# Patient Record
Sex: FEMALE | Race: WHITE | Employment: OTHER | URBAN - METROPOLITAN AREA
[De-identification: names, ages, dates, MRNs, and addresses within clinical notes are randomized per-mention and may not be internally consistent; named-entity substitution may affect disease eponyms.]

---

## 2020-10-12 ENCOUNTER — OFFICE VISIT (OUTPATIENT)
Dept: ORTHOPEDIC SURGERY | Age: 60
End: 2020-10-12
Payer: COMMERCIAL

## 2020-10-12 VITALS — HEIGHT: 71 IN | TEMPERATURE: 98.5 F | BODY MASS INDEX: 20.3 KG/M2 | WEIGHT: 145 LBS

## 2020-10-12 PROCEDURE — 99203 OFFICE O/P NEW LOW 30 MIN: CPT | Performed by: ORTHOPAEDIC SURGERY

## 2020-10-12 RX ORDER — METHYLPREDNISOLONE 4 MG/1
TABLET ORAL
Qty: 1 KIT | Refills: 0 | Status: SHIPPED | OUTPATIENT
Start: 2020-10-12 | End: 2020-12-04 | Stop reason: ALTCHOICE

## 2020-10-12 NOTE — PROGRESS NOTES
Chief Complaint    Hip Pain (right hip)      History of Present Illness:  Delgado Salazar is a 63 y.o. female. She is here today for evaluation of her right hip. She has had lateral sided thigh and hip pain that is been going on now intermittently over the last several years but is been progressively worse over the last 6 weeks. She states the pain is really focalized over the lateral side of her hip also during this time. She states the pain is there intermittently it is primarily with activity after she has been sitting for extended periods of time. She states it will hurt her at nighttime when she is trying to sleep. She denies radicular pain. Denies numbness or tingling. Medical History:  Patient's medications, allergies, past medical, surgical, social and family histories were reviewed and updated as appropriate. Review of Systems:  Pertinent items are noted in HPI  Review of systems reviewed from Patient History Form dated on 10/12/20 and available in the patient's chart under the Media tab. Vital Signs:  Temp 98.5 °F (36.9 °C)   Ht 5' 11\" (1.803 m)   Wt 145 lb (65.8 kg)   BMI 20.22 kg/m²     General Exam:   Constitutional: Patient is adequately groomed with no evidence of malnutrition  DTRs: Deep tendon reflexes are intact  Mental Status: The patient is oriented to time, place and person. The patient's mood and affect are appropriate. Hip Examination:    Inspection: No significant swelling erythema noted to the right hip today    Palpation: There is tenderness palpation directly over the right hip trochanteric bursa. No tenderness over the left hip.   No tenderness along the lumbar spine    Range of Motion: She does have good passive range of motion of the right hip without reproduction of her pain    Strength: She does have good strength with resisted hip flexion as well as resisted abduction adduction    Special Tests: Negative straight leg raise exam.  Negative Eleanora List exam.  Negative hip impingement testing    Skin: There are no rashes, ulcerations or lesions. Gait: Normal      Additional Comments:       Additional Examinations:         Left Upper Extremity: Examination of the left upper extremity does not show any tenderness, deformity or injury. Range of motion is unremarkable. There is no gross instability. There are no rashes, ulcerations or lesions. Strength and tone are normal.    Radiology:     X-rays obtained and reviewed in office:  Views 2 views of the right hip demonstrates no obvious fracture dislocation or other osseous abnormalities. There is well-maintained joint spaces         Assessment : Right hip trochanteric bursitis    Impression:  Encounter Diagnoses   Name Primary?  Right hip pain Yes    Trochanteric bursitis of right hip        Office Procedures:  Orders Placed This Encounter   Procedures    XR HIP RIGHT (2-3 VIEWS)     Standing Status:   Future     Number of Occurrences:   1     Standing Expiration Date:   10/12/2021       Treatment Plan: I discussed the diagnosis and treatment options with her today. Recommend at this time referral to physical therapy for trochanteric bursitis. In addition I am to place her on to a Medrol Dosepak to see if we can reduce down some of her inflammation. She is agreeable with that plan. I will see her back in clinic in 6 weeks for follow-up to make sure she is making improvement.   If continuing to have pain over the lateral side of the hip would recommend cortisone injection at that time

## 2020-10-13 ENCOUNTER — HOSPITAL ENCOUNTER (OUTPATIENT)
Dept: PHYSICAL THERAPY | Age: 60
Setting detail: THERAPIES SERIES
Discharge: HOME | End: 2020-10-13
Payer: COMMERCIAL

## 2020-10-13 PROCEDURE — 97161 PT EVAL LOW COMPLEX 20 MIN: CPT

## 2020-10-13 PROCEDURE — 97140 MANUAL THERAPY 1/> REGIONS: CPT

## 2020-10-13 PROCEDURE — 20560 NDL INSJ W/O NJX 1 OR 2 MUSC: CPT

## 2020-10-13 NOTE — PLAN OF CARE
[place] [address]  Phone [phone]   Fax [phone]                                                       Physical Therapy Certification    Dear Referring Practitioner: Shania Ugalde MD,    We had the pleasure of evaluating the following patient for physical therapy services at [address] [place] [address]. A summary of our findings can be found in the initial assessment below. This includes our plan of care. If you have any questions or concerns regarding these findings, please do not hesitate to contact me at the office phone number checked above.   Thank you for the referral.       Physician Signature:_______________________________Date:__________________  By signing above (or electronic signature), therapists plan is approved by physician      Patient: Ike Baez   : 1960   MRN: [id number]  Referring Physician: Referring Practitioner: Shania Ugalde MD      Evaluation Date: 10/13/2020      Medical Diagnosis Information:  Diagnosis: right hip trochanteric bursitis M70.61   Treatment Diagnosis: R hip pain                                         Insurance information: PT Insurance Information: [place], $5000 family deductible, no copay, 70/30 coinsurance, no visit limit as medically necessary     Precautions/ Contra-indications:     C-SSRS Triggered by Intake questionnaire (Past 2 wk assessment):   [x] No, Questionnaire did not trigger screening.   [] Yes, Patient intake triggered further evaluation      [] C-SSRS Screening completed  [] PCP notified via Plan of Care  [] Emergency services notified     Latex Allergy:  [x]NO      []YES  Preferred Language for Healthcare:   [x]English       []other:    SUBJECTIVE: Patient stated complaint: p1 = dull, achey, and tingling pain that starts over the greater trochanter and goes to lateral knee. Thigh pain has been intermittent for the last 1-2 years. Hip pain is new, started about 2 months ago. Intermittent pain, but annoying. Pain does feel better with movement, but if walking and she stops she will notice pain. Occasional low back pain and she states that if her  is massaging her back he will hit a spot that shoots pain down the lateral thigh. Relevant Medical History: melanoma  Functional Disability Index: LEFS = 11% disability    Pain Scale: 3-4/10 with prolonged standing  Easing factors: yoga, movement,   Provocative factors: achey with prolonged sitting, worse with prolonged standing 10-15 min    Type: []Constant   [x]Intermittent  []Radiating []Localized []other:     Numbness/Tingling: some tingling over lateral thigh    Occupation/School: retired teacher     Living Status/Prior Level of Function: Independent with ADLs and IADLs. Hiking, biking, yoga. OBJECTIVE:     ROM LEFT RIGHT   HIP Flex wnl wnl   HIP Abd     HIP Ext wnl wnl   HIP IR 15 20   HIP ER wnl wnl   Knee ext     Knee Flex          Lumbar flex WNL     Lumbar ext WNL    Lumbar sidebending WNL b    Quadrant WNL B    Strength  LEFT RIGHT   HIP Flexors 5 5   HIP Abductors 5 5   HIP Ext 5 5   Hip ER     Knee EXT (quad) 5 5   Knee Flex (HS) 5 5   Ankle DF 5 5   Ankle PF 5 5   Ankle Inv     Ankle EV          Circumference  Mid apex  7 cm prox               Balance: SLS 30\" bilat, no pain provocation      Reflexes/Sensation:    [x]Dermatomes/Myotomes intact    [x]Reflexes equal and normal bilaterally   []Other:    Joint mobility: Right thigh pain recreation L2-3 CLEMENTINA campbell   []Normal    []Hypo   []Hyper    Palpation: TTP greater trochanter, mid ITB.   Right lumbar paraspinals overdeveloped    Functional Mobility/Transfers: no pain with right sidelying    Posture: WFL    Bandages/Dressings/Incisions: n/a    Gait: (include devices/WB status) WNL    Orthopedic Special Tests: (-) SLR (-) MICHELLE (-) HYACINTH [x] Patient history, allergies, meds reviewed. Medical chart reviewed. See intake form. Review Of Systems (ROS):  [x]Performed Review of systems (Integumentary, CardioPulmonary, Neurological) by intake and observation. Intake form has been scanned into medical record. Patient has been instructed to contact their primary care physician regarding ROS issues if not already being addressed at this time. Co-morbidities/Complexities (which will affect course of rehabilitation):   []None           Arthritic conditions   []Rheumatoid arthritis (M05.9)  []Osteoarthritis (M19.91)   Cardiovascular conditions   []Hypertension (I10)  []Hyperlipidemia (E78.5)  []Angina pectoris (I20)  []Atherosclerosis (I70)   Musculoskeletal conditions   []Disc pathology   []Congenital spine pathologies   []Prior surgical intervention  []Osteoporosis (M81.8)  []Osteopenia (M85.8)   Endocrine conditions   []Hypothyroid (E03.9)  []Hyperthyroid Gastrointestinal conditions   []Constipation (D21.67)   Metabolic conditions   []Morbid obesity (E66.01)  []Diabetes type 1(E10.65) or 2 (E11.65)   []Neuropathy (G60.9)     Pulmonary conditions   []Asthma (J45)  []Coughing   []COPD (J44.9)   Psychological Disorders  []Anxiety (F41.9)  []Depression (F32.9)   []Other:   []Other:          Barriers to/and or personal factors that will affect rehab potential:              []Age  []Sex              []Motivation/Lack of Motivation                        []Co-Morbidities              []Cognitive Function, education/learning barriers              []Environmental, home barriers              []profession/work barriers  []past PT/medical experience  []other:  Justification: no barriers    Falls Risk Assessment (30 days):   [x] Falls Risk assessed and no intervention required.   [] Falls Risk assessed and Patient requires intervention due to being higher risk   TUG score (>12s at risk):     [] Falls education provided, including         ASSESSMENT: s/s from Dry needling      [x]other:  Lumbar facet dysfunction    Prognosis/Rehab Potential:      [x]Excellent   []Good    []Fair   []Poor    Tolerance of evaluation/treatment:    [x]Excellent   []Good    []Fair   []Poor    Physical Therapy Evaluation Complexity Justification  [x] A history of present problem with:  [x] no personal factors and/or comorbidities that impact the plan of care;  []1-2 personal factors and/or comorbidities that impact the plan of care  []3 personal factors and/or comorbidities that impact the plan of care  [x] An examination of body systems using standardized tests and measures addressing any of the following: body structures and functions (impairments), activity limitations, and/or participation restrictions;:  [x] a total of 1-2 or more elements   [] a total of 3 or more elements   [] a total of 4 or more elements   [x] A clinical presentation with:  [x] stable and/or uncomplicated characteristics   [] evolving clinical presentation with changing characteristics  [] unstable and unpredictable characteristics;   [x] Clinical decision making of [x] low, [] moderate, [] high complexity using standardized patient assessment instrument and/or measurable assessment of functional outcome. [x] EVAL (LOW) 22325 (typically 30 minutes face-to-face)  [] EVAL (MOD) 09616 (typically 30 minutes face-to-face)  [] EVAL (HIGH) 97640 (typically 45 minutes face-to-face)  [] RE-EVAL     PLAN:   Frequency/Duration:  1-2 days per week for 4 Weeks:  Interventions:  [x]  Therapeutic exercise including: strength training, ROM, for Lower extremity and core   [x]  NMR activation and proprioception for LE, Glutes and Core   [x]  Manual therapy as indicated for LE, Hip and spine to include: Dry Needling/IASTM, STM, PROM, Gr I-IV mobilizations, manipulation.    [x] Modalities as needed that may include: thermal agents, E-stim, Biofeedback, US, iontophoresis as indicated  [x] Patient education on joint protection, postural re-education, activity modification, progression of HEP. HEP instruction: Patient instructed in, and demonstrated proper form of, exercises. Copy of exercises scanned into media file  (see scanned forms)    GOALS:  Patient stated goal: decreased hip pain  [] Progressing: [] Met: [] Not Met: [] Adjusted    Therapist goals for Patient:   Short Term Goals: To be achieved in: 2 weeks  1. Independent in HEP and progression per patient tolerance, in order to prevent re-injury. [] Progressing: [] Met: [] Not Met: [] Adjusted  2. Patient will have a decrease in pain to facilitate improvement in movement, function, and ADLs as indicated by Functional Deficits. [] Progressing: [] Met: [] Not Met: [] Adjusted    Long Term Goals: To be achieved in: 4 weeks  1. Disability index score of 0% or less for the LEFS to assist with reaching prior level of function. [] Progressing: [] Met: [] Not Met: [] Adjusted  2. Patient will demonstrate increased AROM to WNL to allow for proper joint functioning as indicated by patients Functional Deficits. [] Progressing: [] Met: [] Not Met: [] Adjusted  3. Patient will demonstrate an increase in Strength to good proximal hip strength and control, within 5lb HHD in LE to allow for proper functional mobility as indicated by patients Functional Deficits. [] Progressing: [] Met: [] Not Met: [] Adjusted  4. Patient will return to standing functional activities without increased symptoms or restriction. [] Progressing: [] Met: [] Not Met: [] Adjusted  5. Pt will complete full shower without increased hip pain    [] Progressing: [] Met: [] Not Met: [] Adjusted     Electronically signed by:   Ousmane Dunham PT

## 2020-10-13 NOTE — FLOWSHEET NOTE
[facility]    Physical Therapy Treatment Note/ Progress Report:     Date:  10/13/2020    Patient Name:  Gareld Sacks    :  1960  MRN: [id number]  Medical/Treatment Diagnosis Information:  · Diagnosis: right hip trochanteric bursitis M70.61  · Treatment Diagnosis: R hip pain  Insurance/Certification information:  PT Insurance Information: [employer], $5000 family deductible, no copay, 70/30 coinsurance, no visit limit as medically necessary  Physician Information:  Referring Practitioner: Boston Amador MD  Plan of care signed (Y/N):     Date of Patient follow up with Physician:      Progress Report: []  Yes  [x]  No     Functional Scale:  10/13/2020 LEFS = 11% disability    Date Range for reporting period:  Beginning:  10/13/2020  Ending:      Progress report due (10 Rx/or 30 days whichever is less):      Recertification due (POC duration/ or 90 days whichever is less): 2020     Visit # Insurance Allowable Auth Needed   1 Med nec []Yes    [x]No     Pain level:  3-4/10 With prolonged standing    SUBJECTIVE:  See eval    OBJECTIVE: See eval   Observation:    Test measurements:      RESTRICTIONS/PRECAUTIONS: L2-3 facet referral    Exercises/Interventions:     Therapeutic Ex Resistance Sets/sec Reps Notes   Hand/heel rocking  1 10                                                                                                             Manual Intervention       Dry needling 5 min      Lumbar gapping 5 min   R side   Lumbar Pas 5 min   L2-3 R UPA, gr II                        NMR re-education                                                                          Therapeutic Exercise and NMR EXR  [x] (00016) Provided verbal/tactile cueing for activities related to strengthening, flexibility, endurance, ROM for improvements in LE, proximal hip, and core control with self care, mobility, lifting, ambulation.  [] (99574) Provided verbal/tactile cueing for activities related to improving balance, coordination, kinesthetic sense, posture, motor skill, proprioception  to assist with LE, proximal hip, and core control in self care, mobility, lifting, ambulation and eccentric single leg control. NMR and Therapeutic Activities:    [x] (28275 or 06092) Provided verbal/tactile cueing for activities related to improving balance, coordination, kinesthetic sense, posture, motor skill, proprioception and motor activation to allow for proper function of core, proximal hip and LE with self care and ADLs  [] (04370) Gait Re-education- Provided training and instruction to the patient for proper LE, core and proximal hip recruitment and positioning and eccentric body weight control with ambulation re-education including up and down stairs     Home Exercise Program:    [x] (05160) Reviewed/Progressed HEP activities related to strengthening, flexibility, endurance, ROM of core, proximal hip and LE for functional self-care, mobility, lifting and ambulation/stair navigation   [] (00511)Reviewed/Progressed HEP activities related to improving balance, coordination, kinesthetic sense, posture, motor skill, proprioception of core, proximal hip and LE for self care, mobility, lifting, and ambulation/stair navigation      Manual Treatments:  PROM / STM / Oscillations-Mobs:  G-I, II, III, IV (PA's, Inf., Post.)  [x] (72569) Provided manual therapy to mobilize LE, proximal hip and/or LS spine soft tissue/joints for the purpose of modulating pain, promoting relaxation,  increasing ROM, reducing/eliminating soft tissue swelling/inflammation/restriction, improving soft tissue extensibility and allowing for proper ROM for normal function with self care, mobility, lifting and ambulation. Spoke with   regarding the use of Dry Needling     Dry needling manual therapy: consisted on the placement of 4 needles in the following muscles:  L2-3 multifidi.   A 60 mm needle was inserted, piston, rotated, and coned to produce intramuscular mobilization. These techniques were used to restore functional range of motion, reduce muscle spasm and induce healing in the corresponding musculature. (78759)  Clean Technique was utilized today while applying Dry needling treatment. The treatment sites where cleaned with 70% solution of  isopropyl alcohol . The PT washed their hands and utilized treatment gloves along with hand  prior to inserting the needles. All needles where removed and discarded in the appropriate sharps container. MD has given verbal and/or written approval for this treatment. Modalities:      Charges:  Timed Code Treatment Minutes: eval +10   Total Treatment Minutes: 50       [x] EVAL (LOW) 75190 (typically 20 minutes face-to-face)  [] EVAL (MOD) 11408 (typically 30 minutes face-to-face)  [] EVAL (HIGH) 95323 (typically 45 minutes face-to-face)  [] RE-EVAL     [] HV(51546) x     [] IONTO (71999)  [] NMR (41693) x     [] VASO (97730)  [x] Manual (66269) x     [x] Other: dry needling  [] TA (99840)x     [] Mech Traction (26289)  [] ES(attended) (81720)     [] ES (un) (89988):       GOALS:   Patient stated goal: decreased hip pain  [] Progressing: [] Met: [] Not Met: [] Adjusted    Therapist goals for Patient:   Short Term Goals: To be achieved in: 2 weeks  1. Independent in HEP and progression per patient tolerance, in order to prevent re-injury. [] Progressing: [] Met: [] Not Met: [] Adjusted  2. Patient will have a decrease in pain to facilitate improvement in movement, function, and ADLs as indicated by Functional Deficits. [] Progressing: [] Met: [] Not Met: [] Adjusted    Long Term Goals: To be achieved in: 4 weeks  1. Disability index score of 0% or less for the LEFS to assist with reaching prior level of function. [] Progressing: [] Met: [] Not Met: [] Adjusted  2.  Patient will demonstrate increased AROM to WNL to allow for proper joint functioning as indicated by patients Functional Deficits. [] Progressing: [] Met: [] Not Met: [] Adjusted  3. Patient will demonstrate an increase in Strength to good proximal hip strength and control, within 5lb HHD in LE to allow for proper functional mobility as indicated by patients Functional Deficits. [] Progressing: [] Met: [] Not Met: [] Adjusted  4. Patient will return to standing functional activities without increased symptoms or restriction. [] Progressing: [] Met: [] Not Met: [] Adjusted  5. Pt will complete full shower without increased hip pain    [] Progressing: [] Met: [] Not Met: [] Adjusted     Progression Towards Functional goals:  [] Patient is progressing as expected towards functional goals listed. [] Progression is slowed due to complexities listed. [] Progression has been slowed due to co-morbidities. [x] Plan just implemented, too soon to assess goals progression  [] Other:     ASSESSMENT:  See eval            Treatment/Activity Tolerance:  [x] Patient tolerated treatment well [] Patient limited by fatique  [] Patient limited by pain  [] Patient limited by other medical complications  [] Other:     Overall Progression Towards Functional goals/ Treatment Progress Update:  [] Patient is progressing as expected towards functional goals listed. [] Progression is slowed due to complexities/Impairments listed. [] Progression has been slowed due to co-morbidities.   [x] Plan just implemented, too soon to assess goals progression <30days   [] Goals require adjustment due to lack of progress  [] Patient is not progressing as expected and requires additional follow up with physician  [] Other    Prognosis for POC: [x] Good [] Fair  [] Poor    Patient requires continued skilled intervention: [x] Yes  [] No        PLAN: See eval  [] Continue per plan of care [] Alter current plan (see comments)  [x] Plan of care initiated [] Hold pending MD visit [] Discharge    Electronically signed by: Vani Starr PT    Note: If patient does not return for scheduled/recommended follow up visits, this note will serve as a discharge from care along with the most recent update on progress.

## 2020-10-16 ENCOUNTER — HOSPITAL ENCOUNTER (OUTPATIENT)
Dept: PHYSICAL THERAPY | Age: 60
Setting detail: THERAPIES SERIES
Discharge: HOME | End: 2020-10-16
Payer: COMMERCIAL

## 2020-10-16 PROCEDURE — 97032 APPL MODALITY 1+ESTIM EA 15: CPT

## 2020-10-16 PROCEDURE — 97140 MANUAL THERAPY 1/> REGIONS: CPT

## 2020-10-16 PROCEDURE — 97110 THERAPEUTIC EXERCISES: CPT

## 2020-10-16 PROCEDURE — 20560 NDL INSJ W/O NJX 1 OR 2 MUSC: CPT

## 2020-10-16 NOTE — FLOWSHEET NOTE
[facility]    Physical Therapy Treatment Note/ Progress Report:     Date:  10/16/2020    Patient Name:  Beryl Grant   \"TAMMY\" :  1960  MRN: [id number]  Medical/Treatment Diagnosis Information:  · Diagnosis: right hip trochanteric bursitis M70.61  · Treatment Diagnosis: R hip pain  Insurance/Certification information:  PT Insurance Information: [employer], $5000 family deductible, no copay, 70/30 coinsurance, no visit limit as medically necessary  Physician Information:  Referring Practitioner: Fermin Ramirez MD  Plan of care signed (Y/N):     Date of Patient follow up with Physician:      Progress Report: []  Yes  [x]  No     Functional Scale:  10/13/2020 LEFS = 11% disability    Date Range for reporting period:  Beginning:  10/13/2020  Ending:      Progress report due (10 Rx/or 30 days whichever is less):      Recertification due (POC duration/ or 90 days whichever is less): 2020     Visit # Insurance Allowable Auth Needed   2 Med Salty Young []Yes    [x]No     Pain level:  3-4/10 With prolonged standing    SUBJECTIVE:  Pt walked around hobby lobby after first visit and noticed thigh, not hip, pain during walking. Affected her sleep weds night after doing yardwork and raking leaves. Felt better after activity. No symptoms yesterday.     OBJECTIVE:    Observation:    Test measurements:      RESTRICTIONS/PRECAUTIONS: L2-3 facet referral    Exercises/Interventions:     Therapeutic Ex Resistance Sets/sec Reps Notes   Hand/heel rocking  1 10    Bridging, band across hips blue 3 10    SL clams orange 3 10    SL hip abduction 2# 3 10    KTOS stretch  2 30\"    TB sidestepping orange 3 laps    Fwd step up 6\" 2 10                                                                   Manual Intervention        Dry needling 5 min   Hip today   Lumbar gapping   R side   Lumbar Pas   L2-3 R UPA, gr II   Hip mobs  8 min     Attended stim  8 min NMR re-education                                                                          Therapeutic Exercise and NMR EXR  [x] (28187) Provided verbal/tactile cueing for activities related to strengthening, flexibility, endurance, ROM for improvements in LE, proximal hip, and core control with self care, mobility, lifting, ambulation.  [] (35126) Provided verbal/tactile cueing for activities related to improving balance, coordination, kinesthetic sense, posture, motor skill, proprioception  to assist with LE, proximal hip, and core control in self care, mobility, lifting, ambulation and eccentric single leg control.      NMR and Therapeutic Activities:    [x] (86176 or 95879) Provided verbal/tactile cueing for activities related to improving balance, coordination, kinesthetic sense, posture, motor skill, proprioception and motor activation to allow for proper function of core, proximal hip and LE with self care and ADLs  [] (60864) Gait Re-education- Provided training and instruction to the patient for proper LE, core and proximal hip recruitment and positioning and eccentric body weight control with ambulation re-education including up and down stairs     Home Exercise Program:    [x] (87556) Reviewed/Progressed HEP activities related to strengthening, flexibility, endurance, ROM of core, proximal hip and LE for functional self-care, mobility, lifting and ambulation/stair navigation   [] (66821)Reviewed/Progressed HEP activities related to improving balance, coordination, kinesthetic sense, posture, motor skill, proprioception of core, proximal hip and LE for self care, mobility, lifting, and ambulation/stair navigation      Manual Treatments:  PROM / STM / Oscillations-Mobs:  G-I, II, III, IV (PA's, Inf., Post.)  [x] (68577) Provided manual therapy to mobilize LE, proximal hip and/or LS spine soft tissue/joints for the purpose of modulating pain, promoting relaxation,  increasing ROM, reducing/eliminating soft tissue swelling/inflammation/restriction, improving soft tissue extensibility and allowing for proper ROM for normal function with self care, mobility, lifting and ambulation. Spoke with   regarding the use of Dry Needling     Dry needling manual therapy: consisted on the placement of 7 needles in the following muscles:  glute med, ITB. A 60 mm needle was inserted, piston, rotated, and coned to produce intramuscular mobilization. These techniques were used to restore functional range of motion, reduce muscle spasm and induce healing in the corresponding musculature. (39162)  Clean Technique was utilized today while applying Dry needling treatment. The treatment sites where cleaned with 70% solution of  isopropyl alcohol . The PT washed their hands and utilized treatment gloves along with hand  prior to inserting the needles. All needles where removed and discarded in the appropriate sharps container. MD has given verbal and/or written approval for this treatment. Attended low frequency (1-20Hz) electrical stimulation was utilized in conjunction with Dry Needling:  the Estim was manipulated between all above needles for a period of 8 min. at 5 volts. The low frequency electrical stimulation was used to help reduce muscle spasm and help to interrupt /[place] the pain cycle.  (13474)       Modalities:      Charges:  Timed Code Treatment Minutes: 45   Total Treatment Minutes: 45       [] EVAL (LOW) 01266 (typically 20 minutes face-to-face)  [] EVAL (MOD) 60669 (typically 30 minutes face-to-face)  [] EVAL (HIGH) 96005 (typically 45 minutes face-to-face)  [] RE-EVAL     [x] PN(38929) x 1    [] IONTO (89461)  [] NMR (68562) x     [] VASO (05471)  [x] Manual (67445) x  1   [x] Other: dry needling  [] TA (20353)x     [] Mech Traction (20057)  [x] ES(attended) (36959)     [] ES (un) (25554):       GOALS:   Patient stated goal: decreased hip pain  [] Progressing: [] Met: [] Not Met: [] Adjusted    Therapist goals for Patient:   Short Term Goals: To be achieved in: 2 weeks  1. Independent in HEP and progression per patient tolerance, in order to prevent re-injury. [] Progressing: [] Met: [] Not Met: [] Adjusted  2. Patient will have a decrease in pain to facilitate improvement in movement, function, and ADLs as indicated by Functional Deficits. [] Progressing: [] Met: [] Not Met: [] Adjusted    Long Term Goals: To be achieved in: 4 weeks  1. Disability index score of 0% or less for the LEFS to assist with reaching prior level of function. [] Progressing: [] Met: [] Not Met: [] Adjusted  2. Patient will demonstrate increased AROM to WNL to allow for proper joint functioning as indicated by patients Functional Deficits. [] Progressing: [] Met: [] Not Met: [] Adjusted  3. Patient will demonstrate an increase in Strength to good proximal hip strength and control, within 5lb HHD in LE to allow for proper functional mobility as indicated by patients Functional Deficits. [] Progressing: [] Met: [] Not Met: [] Adjusted  4. Patient will return to standing functional activities without increased symptoms or restriction. [] Progressing: [] Met: [] Not Met: [] Adjusted  5. Pt will complete full shower without increased hip pain    [] Progressing: [] Met: [] Not Met: [] Adjusted     Progression Towards Functional goals:  [x] Patient is progressing as expected towards functional goals listed. [] Progression is slowed due to complexities listed. [] Progression has been slowed due to co-morbidities. [] Plan just implemented, too soon to assess goals progression  [] Other:     ASSESSMENT:  No significant change with lumbar treatment from last session, so treatment focused on hip today. Pt does continue to have pain recreation with right UPA glide at L3, so not convinced that is is 100% hip pathology.             Treatment/Activity Tolerance:  [x] Patient tolerated treatment well [] Patient limited by lenin  [] Patient limited by pain  [] Patient limited by other medical complications  [] Other:     Overall Progression Towards Functional goals/ Treatment Progress Update:  [] Patient is progressing as expected towards functional goals listed. [] Progression is slowed due to complexities/Impairments listed. [] Progression has been slowed due to co-morbidities. [x] Plan just implemented, too soon to assess goals progression <30days   [] Goals require adjustment due to lack of progress  [] Patient is not progressing as expected and requires additional follow up with physician  [] Other    Prognosis for POC: [x] Good [] Fair  [] Poor    Patient requires continued skilled intervention: [x] Yes  [] No        PLAN:   [x] Continue per plan of care [] Alter current plan (see comments)  [] Plan of care initiated [] Hold pending MD visit [] Discharge    Electronically signed by: Mikael Samson PT    Note: If patient does not return for scheduled/recommended follow up visits, this note will serve as a discharge from care along with the most recent update on progress.

## 2020-10-20 ENCOUNTER — HOSPITAL ENCOUNTER (OUTPATIENT)
Dept: PHYSICAL THERAPY | Age: 60
Setting detail: THERAPIES SERIES
Discharge: HOME | End: 2020-10-20
Payer: COMMERCIAL

## 2020-10-20 PROCEDURE — 97110 THERAPEUTIC EXERCISES: CPT

## 2020-10-20 PROCEDURE — 97140 MANUAL THERAPY 1/> REGIONS: CPT

## 2020-10-20 PROCEDURE — 20560 NDL INSJ W/O NJX 1 OR 2 MUSC: CPT

## 2020-10-20 PROCEDURE — 97032 APPL MODALITY 1+ESTIM EA 15: CPT

## 2020-10-20 NOTE — FLOWSHEET NOTE
[facility]    Physical Therapy Treatment Note/ Progress Report:     Date:  10/20/2020    Patient Name:  Alfreida Boast   \"TAMMY\" :  1960  MRN: [id number]  Medical/Treatment Diagnosis Information:  · Diagnosis: right hip trochanteric bursitis M70.61  · Treatment Diagnosis: R hip pain  Insurance/Certification information:  PT Insurance Information: [place], $5000 family deductible, no copay, 70/30 coinsurance, no visit limit as medically necessary  Physician Information:  Referring Practitioner: aBldo Lewis MD  Plan of care signed (Y/N):     Date of Patient follow up with Physician:      Progress Report: []  Yes  [x]  No     Functional Scale:  10/13/2020 LEFS = 11% disability    Date Range for reporting period:  Beginning:  10/13/2020  Ending:      Progress report due (10 Rx/or 30 days whichever is less):      Recertification due (POC duration/ or 90 days whichever is less): 2020     Visit # Insurance Allowable Auth Needed   3 Med nec []Yes    [x]No     Pain level:  3-4/10 With prolonged standing    SUBJECTIVE:  Overall pain feels a little better. Still bothers her when she is standing and cooking. Mostly thigh pain now not hip.      OBJECTIVE:    Observation:    Test measurements:      RESTRICTIONS/PRECAUTIONS: L2-3 facet referral    Exercises/Interventions:     Therapeutic Ex Resistance Sets/sec Reps Notes   Hand/heel rocking  1 10    Bridging, band across hips blue 3 10    SL clams orange 3 10    SL hip abduction 2# 3 10    KTOS stretch  2 30\"    TB sidestepping orange 3 laps    Fwd step up 6\" 2 10    Bird dog  1 10    palloff press blue 2 10                                                     Manual Intervention        Dry needling 5 min   Lumbar today   Lumbar gapping 5 min  Manip, bilat   Lumbar Pas   L2-3 R UPA, gr II   Hip mobs      Attended stim  8 min            NMR re-education Therapeutic Exercise and NMR EXR  [x] (58799) Provided verbal/tactile cueing for activities related to strengthening, flexibility, endurance, ROM for improvements in LE, proximal hip, and core control with self care, mobility, lifting, ambulation.  [] (17028) Provided verbal/tactile cueing for activities related to improving balance, coordination, kinesthetic sense, posture, motor skill, proprioception  to assist with LE, proximal hip, and core control in self care, mobility, lifting, ambulation and eccentric single leg control.      NMR and Therapeutic Activities:    [x] (59912 or 94866) Provided verbal/tactile cueing for activities related to improving balance, coordination, kinesthetic sense, posture, motor skill, proprioception and motor activation to allow for proper function of core, proximal hip and LE with self care and ADLs  [] (26887) Gait Re-education- Provided training and instruction to the patient for proper LE, core and proximal hip recruitment and positioning and eccentric body weight control with ambulation re-education including up and down stairs     Home Exercise Program:    [x] (01554) Reviewed/Progressed HEP activities related to strengthening, flexibility, endurance, ROM of core, proximal hip and LE for functional self-care, mobility, lifting and ambulation/stair navigation   [] (69272)Reviewed/Progressed HEP activities related to improving balance, coordination, kinesthetic sense, posture, motor skill, proprioception of core, proximal hip and LE for self care, mobility, lifting, and ambulation/stair navigation      Manual Treatments:  PROM / STM / Oscillations-Mobs:  G-I, II, III, IV (PA's, Inf., Post.)  [x] (38856) Provided manual therapy to mobilize LE, proximal hip and/or LS spine soft tissue/joints for the purpose of modulating pain, promoting relaxation,  increasing ROM, reducing/eliminating soft tissue swelling/inflammation/restriction, improving soft tissue extensibility and allowing for proper ROM for normal function with self care, mobility, lifting and ambulation. Spoke with   regarding the use of Dry Needling     Dry needling manual therapy: consisted on the placement of 7 needles in the following muscles:  glute med, ITB. A 60 mm needle was inserted, piston, rotated, and coned to produce intramuscular mobilization. These techniques were used to restore functional range of motion, reduce muscle spasm and induce healing in the corresponding musculature. (63206)  Clean Technique was utilized today while applying Dry needling treatment. The treatment sites where cleaned with 70% solution of  isopropyl alcohol . The PT washed their hands and utilized treatment gloves along with hand  prior to inserting the needles. All needles where removed and discarded in the appropriate sharps container. MD has given verbal and/or written approval for this treatment. Attended low frequency (1-20Hz) electrical stimulation was utilized in conjunction with Dry Needling:  the Estim was manipulated between all above needles for a period of 8 min. at 5 volts. The low frequency electrical stimulation was used to help reduce muscle spasm and help to interrupt /[place] the pain cycle. (36704)       Modalities:      Charges:  Timed Code Treatment Minutes: 45   Total Treatment Minutes: 50       [] EVAL (LOW) 10175 (typically 20 minutes face-to-face)  [] EVAL (MOD) 59427 (typically 30 minutes face-to-face)  [] EVAL (HIGH) 39857 (typically 45 minutes face-to-face)  [] RE-EVAL     [x] FJ(45926) x 1    [] IONTO (33372)  [] NMR (53656) x     [] VASO (61122)  [x] Manual (19916) x  1   [x] Other: dry needling  [] TA (86709)x     [] Mech Traction (35603)  [x] ES(attended) (88834)     [] ES (un) (79957):       GOALS:   Patient stated goal: decreased hip pain  [] Progressing: [] Met: [] Not Met: [] Adjusted    Therapist goals for Patient:   Short Term Goals:  To be achieved in: 2 weeks  1. Independent in HEP and progression per patient tolerance, in order to prevent re-injury. [] Progressing: [] Met: [] Not Met: [] Adjusted  2. Patient will have a decrease in pain to facilitate improvement in movement, function, and ADLs as indicated by Functional Deficits. [] Progressing: [] Met: [] Not Met: [] Adjusted    Long Term Goals: To be achieved in: 4 weeks  1. Disability index score of 0% or less for the LEFS to assist with reaching prior level of function. [] Progressing: [] Met: [] Not Met: [] Adjusted  2. Patient will demonstrate increased AROM to WNL to allow for proper joint functioning as indicated by patients Functional Deficits. [] Progressing: [] Met: [] Not Met: [] Adjusted  3. Patient will demonstrate an increase in Strength to good proximal hip strength and control, within 5lb HHD in LE to allow for proper functional mobility as indicated by patients Functional Deficits. [] Progressing: [] Met: [] Not Met: [] Adjusted  4. Patient will return to standing functional activities without increased symptoms or restriction. [] Progressing: [] Met: [] Not Met: [] Adjusted  5. Pt will complete full shower without increased hip pain    [] Progressing: [] Met: [] Not Met: [] Adjusted     Progression Towards Functional goals:  [x] Patient is progressing as expected towards functional goals listed. [] Progression is slowed due to complexities listed. [] Progression has been slowed due to co-morbidities. [] Plan just implemented, too soon to assess goals progression  [] Other:     ASSESSMENT:  Dry needling to the lumbar facets revealed significant depth difference between R and L, with right column much more shallow. Alignment normal ruling out rotation in the lumbar spine. Likely osteophytes causing difference, but unable to confirm this without lumbar XR. Pt tolerated therex well and symptoms are responding slowly.          Treatment/Activity Tolerance:  [x] Patient tolerated treatment well [] Patient limited by fatique  [] Patient limited by pain  [] Patient limited by other medical complications  [] Other:     Overall Progression Towards Functional goals/ Treatment Progress Update:  [] Patient is progressing as expected towards functional goals listed. [] Progression is slowed due to complexities/Impairments listed. [] Progression has been slowed due to co-morbidities. [x] Plan just implemented, too soon to assess goals progression <30days   [] Goals require adjustment due to lack of progress  [] Patient is not progressing as expected and requires additional follow up with physician  [] Other    Prognosis for POC: [x] Good [] Fair  [] Poor    Patient requires continued skilled intervention: [x] Yes  [] No        PLAN:   [x] Continue per plan of care [] Alter current plan (see comments)  [] Plan of care initiated [] Hold pending MD visit [] Discharge    Electronically signed by: Ousmane Dunham PT    Note: If patient does not return for scheduled/recommended follow up visits, this note will serve as a discharge from care along with the most recent update on progress.

## 2020-10-22 ENCOUNTER — HOSPITAL ENCOUNTER (OUTPATIENT)
Dept: PHYSICAL THERAPY | Age: 60
Setting detail: THERAPIES SERIES
Discharge: HOME | End: 2020-10-22
Payer: COMMERCIAL

## 2020-10-22 PROCEDURE — 97110 THERAPEUTIC EXERCISES: CPT

## 2020-10-22 PROCEDURE — 97032 APPL MODALITY 1+ESTIM EA 15: CPT

## 2020-10-22 PROCEDURE — 20560 NDL INSJ W/O NJX 1 OR 2 MUSC: CPT

## 2020-10-22 NOTE — FLOWSHEET NOTE
[facility]    Physical Therapy Treatment Note/ Progress Report:     Date:  10/22/2020    Patient Name:  Elisabeth Lim   \"TAMMY\" :  1960  MRN: [id number]  Medical/Treatment Diagnosis Information:  · Diagnosis: right hip trochanteric bursitis M70.61  · Treatment Diagnosis: R hip pain  Insurance/Certification information:  PT Insurance Information: [place], $5000 family deductible, no copay, 70/30 coinsurance, no visit limit as medically necessary  Physician Information:  Referring Practitioner: Dena Rhodes MD  Plan of care signed (Y/N):     Date of Patient follow up with Physician:      Progress Report: []  Yes  [x]  No     Functional Scale:  10/13/2020 LEFS = 11% disability    Date Range for reporting period:  Beginning:  10/13/2020  Ending:      Progress report due (10 Rx/or 30 days whichever is less):      Recertification due (POC duration/ or 90 days whichever is less): 2020     Visit # Insurance Allowable Auth Needed   4 Med nec []Yes    [x]No     Pain level:  3-4/10 With prolonged standing    SUBJECTIVE:  Pt reports that her low back is feeling better with less stiffness in the morning. Right thigh continues to bother her with cooking and doing dishes.      OBJECTIVE:    Observation:    Test measurements:      RESTRICTIONS/PRECAUTIONS: L2-3 facet referral    Exercises/Interventions:     Therapeutic Ex Resistance Sets/sec Reps Notes   Hand/heel rocking  1 10    Bridging, band across hips blue 3 10    SL clams orange 3 10    SL hip abduction 2# 3 10    KTOS stretch  2 30\"    TB sidestepping orange 3 laps    Fwd step up 6\" 2 10    Bird dog  1 10    palloff press blue 2 10    SB TB rotation blue 1 20x    Landmine lumbar twists  Landmine RDL  1  2 10  10                                       Manual Intervention 5'       Dry needling 5 min   Lumbar today   Lumbar gapping 5 min  Manip, bilat   Lumbar Pas   L2-3 R UPA, gr II Hip mobs      Attended stim  8 min            NMR re-education                                                                          Therapeutic Exercise and NMR EXR  [x] (52892) Provided verbal/tactile cueing for activities related to strengthening, flexibility, endurance, ROM for improvements in LE, proximal hip, and core control with self care, mobility, lifting, ambulation.  [] (26889) Provided verbal/tactile cueing for activities related to improving balance, coordination, kinesthetic sense, posture, motor skill, proprioception  to assist with LE, proximal hip, and core control in self care, mobility, lifting, ambulation and eccentric single leg control.      NMR and Therapeutic Activities:    [x] (63154 or 32869) Provided verbal/tactile cueing for activities related to improving balance, coordination, kinesthetic sense, posture, motor skill, proprioception and motor activation to allow for proper function of core, proximal hip and LE with self care and ADLs  [] (04401) Gait Re-education- Provided training and instruction to the patient for proper LE, core and proximal hip recruitment and positioning and eccentric body weight control with ambulation re-education including up and down stairs     Home Exercise Program:    [x] (12073) Reviewed/Progressed HEP activities related to strengthening, flexibility, endurance, ROM of core, proximal hip and LE for functional self-care, mobility, lifting and ambulation/stair navigation   [] (91429)Reviewed/Progressed HEP activities related to improving balance, coordination, kinesthetic sense, posture, motor skill, proprioception of core, proximal hip and LE for self care, mobility, lifting, and ambulation/stair navigation      Manual Treatments:  PROM / STM / Oscillations-Mobs:  G-I, II, III, IV (PA's, Inf., Post.)  [x] (90570) Provided manual therapy to mobilize LE, proximal hip and/or LS spine soft tissue/joints for the purpose of modulating pain, promoting relaxation,  increasing ROM, reducing/eliminating soft tissue swelling/inflammation/restriction, improving soft tissue extensibility and allowing for proper ROM for normal function with self care, mobility, lifting and ambulation. Spoke with   regarding the use of Dry Needling     Dry needling manual therapy: consisted on the placement of 7 needles in the following muscles:  glute med, ITB. A 60 mm needle was inserted, piston, rotated, and coned to produce intramuscular mobilization. These techniques were used to restore functional range of motion, reduce muscle spasm and induce healing in the corresponding musculature. (83544)  Clean Technique was utilized today while applying Dry needling treatment. The treatment sites where cleaned with 70% solution of  isopropyl alcohol . The PT washed their hands and utilized treatment gloves along with hand  prior to inserting the needles. All needles where removed and discarded in the appropriate sharps container. MD has given verbal and/or written approval for this treatment. Attended low frequency (1-20Hz) electrical stimulation was utilized in conjunction with Dry Needling:  the Estim was manipulated between all above needles for a period of 8 min. at 5 volts. The low frequency electrical stimulation was used to help reduce muscle spasm and help to interrupt /[place] the pain cycle.  (23795)       Modalities:      Charges:  Timed Code Treatment Minutes: 45   Total Treatment Minutes: 50       [] EVAL (LOW) 50880 (typically 20 minutes face-to-face)  [] EVAL (MOD) 19688 (typically 30 minutes face-to-face)  [] EVAL (HIGH) 69515 (typically 45 minutes face-to-face)  [] RE-EVAL     [x] TK(40254) x 1    [] IONTO (88343)  [] NMR (50230) x     [] VASO (50349)  [x] Manual (68194) x  1   [x] Other: dry needling  [] TA (87307)x     [] Mech Traction (82820)  [x] ES(attended) (97111)     [] ES (un) (63607):       GOALS:   Patient stated goal: decreased hip pain  [] Progressing: [] Met: [] Not Met: [] Adjusted    Therapist goals for Patient:   Short Term Goals: To be achieved in: 2 weeks  1. Independent in HEP and progression per patient tolerance, in order to prevent re-injury. [] Progressing: [] Met: [] Not Met: [] Adjusted  2. Patient will have a decrease in pain to facilitate improvement in movement, function, and ADLs as indicated by Functional Deficits. [] Progressing: [] Met: [] Not Met: [] Adjusted    Long Term Goals: To be achieved in: 4 weeks  1. Disability index score of 0% or less for the LEFS to assist with reaching prior level of function. [] Progressing: [] Met: [] Not Met: [] Adjusted  2. Patient will demonstrate increased AROM to WNL to allow for proper joint functioning as indicated by patients Functional Deficits. [] Progressing: [] Met: [] Not Met: [] Adjusted  3. Patient will demonstrate an increase in Strength to good proximal hip strength and control, within 5lb HHD in LE to allow for proper functional mobility as indicated by patients Functional Deficits. [] Progressing: [] Met: [] Not Met: [] Adjusted  4. Patient will return to standing functional activities without increased symptoms or restriction. [] Progressing: [] Met: [] Not Met: [] Adjusted  5. Pt will complete full shower without increased hip pain    [] Progressing: [] Met: [] Not Met: [] Adjusted     Progression Towards Functional goals:  [x] Patient is progressing as expected towards functional goals listed. [] Progression is slowed due to complexities listed. [] Progression has been slowed due to co-morbidities. [] Plan just implemented, too soon to assess goals progression  [] Other:     ASSESSMENT:    Pt continues to have facet referral to the right thigh, particularly with periods of prolonged loading of the spinal column. Discussed posture during standing with good pt understanding. Pt also progressed with lumbar stability work with good tolerance.

## 2020-10-27 ENCOUNTER — HOSPITAL ENCOUNTER (OUTPATIENT)
Dept: PHYSICAL THERAPY | Age: 60
Setting detail: THERAPIES SERIES
Discharge: HOME | End: 2020-10-27
Payer: COMMERCIAL

## 2020-10-27 PROCEDURE — 97110 THERAPEUTIC EXERCISES: CPT

## 2020-10-27 PROCEDURE — 97140 MANUAL THERAPY 1/> REGIONS: CPT

## 2020-10-27 NOTE — FLOWSHEET NOTE
[facility]    Physical Therapy Treatment Note/ Progress Report:     Date:  10/27/2020    Patient Name:  Oscar Andrade   \"TAMMY\" :  1960  MRN: [id number]  Medical/Treatment Diagnosis Information:  · Diagnosis: right hip trochanteric bursitis M70.61  · Treatment Diagnosis: R hip pain  Insurance/Certification information:  PT Insurance Information: [place], $5000 family deductible, no copay, 70/30 coinsurance, no visit limit as medically necessary  Physician Information:  Referring Practitioner: Pj Sanchez MD  Plan of care signed (Y/N):     Date of Patient follow up with Physician:      Progress Report: []  Yes  [x]  No     Functional Scale:  10/13/2020 LEFS = 11% disability    Date Range for reporting period:  Beginning:  10/13/2020  Ending:      Progress report due (10 Rx/or 30 days whichever is less):      Recertification due (POC duration/ or 90 days whichever is less): 2020     Visit # Insurance Allowable Auth Needed   5 Med nec []Yes    [x]No     Pain level:  3/10 right thigh    SUBJECTIVE:  Pt notes that she woke with thigh pain today which is unusual.     OBJECTIVE:    Observation:    Test measurements:      RESTRICTIONS/PRECAUTIONS: L2-3 facet referral    Exercises/Interventions:     Therapeutic Ex Resistance Sets/sec Reps Notes   Hand/heel rocking  1 10    Bridging, band across hips blue 3 10    SL clams orange 3 10    SL hip abduction 2# 3 10    KTOS stretch  2 30\"    TB sidestepping orange 3 laps    Fwd step up 6\" 2 10 With 6# MB press   Bird dog  1 10    palloff press blue 2 10    SB TB rotation blue 1 20x    Landmine lumbar twists  Landmine RDL  1  2 10  10    EOB bilateral hip ext  1 10                                Manual Intervention 10'       Dry needling    Lumbar today   Lumbar gapping 5 min  Manip, bilat   Lumbar Pas 5 min  L2-3 R UPA, gr II   Hip mobs      Attended stim              NMR re-education Therapeutic Exercise and NMR EXR  [x] (84509) Provided verbal/tactile cueing for activities related to strengthening, flexibility, endurance, ROM for improvements in LE, proximal hip, and core control with self care, mobility, lifting, ambulation.  [] (87330) Provided verbal/tactile cueing for activities related to improving balance, coordination, kinesthetic sense, posture, motor skill, proprioception  to assist with LE, proximal hip, and core control in self care, mobility, lifting, ambulation and eccentric single leg control.      NMR and Therapeutic Activities:    [x] (30615 or 49982) Provided verbal/tactile cueing for activities related to improving balance, coordination, kinesthetic sense, posture, motor skill, proprioception and motor activation to allow for proper function of core, proximal hip and LE with self care and ADLs  [] (66235) Gait Re-education- Provided training and instruction to the patient for proper LE, core and proximal hip recruitment and positioning and eccentric body weight control with ambulation re-education including up and down stairs     Home Exercise Program:    [x] (52688) Reviewed/Progressed HEP activities related to strengthening, flexibility, endurance, ROM of core, proximal hip and LE for functional self-care, mobility, lifting and ambulation/stair navigation   [] (10086)Reviewed/Progressed HEP activities related to improving balance, coordination, kinesthetic sense, posture, motor skill, proprioception of core, proximal hip and LE for self care, mobility, lifting, and ambulation/stair navigation      Manual Treatments:  PROM / STM / Oscillations-Mobs:  G-I, II, III, IV (PA's, Inf., Post.)  [x] (25187) Provided manual therapy to mobilize LE, proximal hip and/or LS spine soft tissue/joints for the purpose of modulating pain, promoting relaxation,  increasing ROM, reducing/eliminating soft tissue swelling/inflammation/restriction, improving soft tissue extensibility and allowing for proper ROM for normal function with self care, mobility, lifting and ambulation. Spoke with   regarding the use of Dry Needling     Dry needling manual therapy: consisted on the placement of 7 needles in the following muscles:  glute med, ITB. A 60 mm needle was inserted, piston, rotated, and coned to produce intramuscular mobilization. These techniques were used to restore functional range of motion, reduce muscle spasm and induce healing in the corresponding musculature. (81682)  Clean Technique was utilized today while applying Dry needling treatment. The treatment sites where cleaned with 70% solution of  isopropyl alcohol . The PT washed their hands and utilized treatment gloves along with hand  prior to inserting the needles. All needles where removed and discarded in the appropriate sharps container. MD has given verbal and/or written approval for this treatment. Attended low frequency (1-20Hz) electrical stimulation was utilized in conjunction with Dry Needling:  the Estim was manipulated between all above needles for a period of 8 min. at 5 volts. The low frequency electrical stimulation was used to help reduce muscle spasm and help to interrupt /[place] the pain cycle.  (99036)       Modalities:      Charges:  Timed Code Treatment Minutes: 45   Total Treatment Minutes: 45       [] EVAL (LOW) 88206 (typically 20 minutes face-to-face)  [] EVAL (MOD) 97302 (typically 30 minutes face-to-face)  [] EVAL (HIGH) 58309 (typically 45 minutes face-to-face)  [] RE-EVAL     [x] UT(81147) x 2    [] IONTO (25329)  [] NMR (99636) x     [] VASO (67385)  [x] Manual (41061) x  1   [] Other: dry needling  [] TA (99651)x     [] Mech Traction (56014)  [] ES(attended) (92311)     [] ES (un) (65871):       GOALS:   Patient stated goal: decreased hip pain  [] Progressing: [] Met: [] Not Met: [] Adjusted    Therapist goals for Patient:   Short Term Goals: To be achieved in: 2 weeks  1. Independent in HEP and progression per patient tolerance, in order to prevent re-injury. [] Progressing: [] Met: [] Not Met: [] Adjusted  2. Patient will have a decrease in pain to facilitate improvement in movement, function, and ADLs as indicated by Functional Deficits. [] Progressing: [] Met: [] Not Met: [] Adjusted    Long Term Goals: To be achieved in: 4 weeks  1. Disability index score of 0% or less for the LEFS to assist with reaching prior level of function. [] Progressing: [] Met: [] Not Met: [] Adjusted  2. Patient will demonstrate increased AROM to WNL to allow for proper joint functioning as indicated by patients Functional Deficits. [] Progressing: [] Met: [] Not Met: [] Adjusted  3. Patient will demonstrate an increase in Strength to good proximal hip strength and control, within 5lb HHD in LE to allow for proper functional mobility as indicated by patients Functional Deficits. [] Progressing: [] Met: [] Not Met: [] Adjusted  4. Patient will return to standing functional activities without increased symptoms or restriction. [] Progressing: [] Met: [] Not Met: [] Adjusted  5. Pt will complete full shower without increased hip pain    [] Progressing: [] Met: [] Not Met: [] Adjusted     Progression Towards Functional goals:  [x] Patient is progressing as expected towards functional goals listed. [] Progression is slowed due to complexities listed. [] Progression has been slowed due to co-morbidities. [] Plan just implemented, too soon to assess goals progression  [] Other:     ASSESSMENT:    Thigh pain relieved post manipulation. Spent some time with lumbar UPA glides as this recreated right thigh pain, but improved referral today. Progressed exercises with good tolerance.      Treatment/Activity Tolerance:  [x] Patient tolerated treatment well [] Patient limited by [place]in  [] Patient limited by

## 2020-10-30 ENCOUNTER — HOSPITAL ENCOUNTER (OUTPATIENT)
Dept: PHYSICAL THERAPY | Age: 60
Setting detail: THERAPIES SERIES
Discharge: HOME | End: 2020-10-30
Payer: COMMERCIAL

## 2020-10-30 PROCEDURE — 97140 MANUAL THERAPY 1/> REGIONS: CPT

## 2020-10-30 PROCEDURE — 97110 THERAPEUTIC EXERCISES: CPT

## 2020-10-30 NOTE — FLOWSHEET NOTE
tissue/joints for the purpose of modulating pain, promoting relaxation,  increasing ROM, reducing/eliminating soft tissue swelling/inflammation/restriction, improving soft tissue extensibility and allowing for proper ROM for normal function with self care, mobility, lifting and ambulation. Spoke with   regarding the use of Dry Needling     Dry needling manual therapy: consisted on the placement of 7 needles in the following muscles:  glute med, ITB. A 60 mm needle was inserted, piston, rotated, and coned to produce intramuscular mobilization. These techniques were used to restore functional range of motion, reduce muscle spasm and induce healing in the corresponding musculature. (67889)  Clean Technique was utilized today while applying Dry needling treatment. The treatment sites where cleaned with 70% solution of  isopropyl alcohol . The PT washed their hands and utilized treatment gloves along with hand  prior to inserting the needles. All needles where removed and discarded in the appropriate sharps container. MD has given verbal and/or written approval for this treatment. Attended low frequency (1-20Hz) electrical stimulation was utilized in conjunction with Dry Needling:  the Estim was manipulated between all above needles for a period of 8 min. at 5 volts. The low frequency electrical stimulation was used to help reduce muscle spasm and help to interrupt /[place] the pain cycle.  (32263)       Modalities:      Charges:  Timed Code Treatment Minutes: 45   Total Treatment Minutes: 45       [] EVAL (LOW) 99059 (typically 20 minutes face-to-face)  [] EVAL (MOD) 75237 (typically 30 minutes face-to-face)  [] EVAL (HIGH) 73438 (typically 45 minutes face-to-face)  [] RE-EVAL     [x] OB(82108) x 2    [] IONTO (87748)  [] NMR (19179) x     [] VASO (82183)  [x] Manual (25571) x  1   [] Other: dry needling  [] TA (25547)x     [] Mech Traction (28698)  [] ES(attended) (72694)     [] ES (un) (49966):       GOALS:   Patient stated goal: decreased hip pain  [] Progressing: [] Met: [] Not Met: [] Adjusted    Therapist goals for Patient:   Short Term Goals: To be achieved in: 2 weeks  1. Independent in HEP and progression per patient tolerance, in order to prevent re-injury. [] Progressing: [] Met: [] Not Met: [] Adjusted  2. Patient will have a decrease in pain to facilitate improvement in movement, function, and ADLs as indicated by Functional Deficits. [] Progressing: [] Met: [] Not Met: [] Adjusted    Long Term Goals: To be achieved in: 4 weeks  1. Disability index score of 0% or less for the LEFS to assist with reaching prior level of function. [] Progressing: [] Met: [] Not Met: [] Adjusted  2. Patient will demonstrate increased AROM to WNL to allow for proper joint functioning as indicated by patients Functional Deficits. [] Progressing: [] Met: [] Not Met: [] Adjusted  3. Patient will demonstrate an increase in Strength to good proximal hip strength and control, within 5lb HHD in LE to allow for proper functional mobility as indicated by patients Functional Deficits. [] Progressing: [] Met: [] Not Met: [] Adjusted  4. Patient will return to standing functional activities without increased symptoms or restriction. [] Progressing: [] Met: [] Not Met: [] Adjusted  5. Pt will complete full shower without increased hip pain    [] Progressing: [] Met: [] Not Met: [] Adjusted     Progression Towards Functional goals:  [x] Patient is progressing as expected towards functional goals listed. [] Progression is slowed due to complexities listed. [] Progression has been slowed due to co-morbidities. [] Plan just implemented, too soon to assess goals progression  [] Other:     ASSESSMENT: UPA glides less provocative post manual therapy. Pt was painfree throughout session. Discussed possible referral for a facet injection as lateral thigh pain has not decreased significantly.   Will send a message to Dr. Darcy Roblero to report findings. Treatment/Activity Tolerance:  [x] Patient tolerated treatment well [] Patient limited by fatique  [] Patient limited by pain  [] Patient limited by other medical complications  [] Other:     Overall Progression Towards Functional goals/ Treatment Progress Update:  [] Patient is progressing as expected towards functional goals listed. [] Progression is slowed due to complexities/Impairments listed. [] Progression has been slowed due to co-morbidities. [x] Plan just implemented, too soon to assess goals progression <30days   [] Goals require adjustment due to lack of progress  [] Patient is not progressing as expected and requires additional follow up with physician  [] Other    Prognosis for POC: [x] Good [] Fair  [] Poor    Patient requires continued skilled intervention: [x] Yes  [] No        PLAN:   [x] Continue per plan of care [] Alter current plan (see comments)  [] Plan of care initiated [] Hold pending MD visit [] Discharge    Electronically signed by: Teresa Darby PT    Note: If patient does not return for scheduled/recommended follow up visits, this note will serve as a discharge from care along with the most recent update on progress.

## 2020-11-05 ENCOUNTER — TELEPHONE (OUTPATIENT)
Dept: PHYSICAL THERAPY | Age: 60
End: 2020-11-05

## 2020-11-05 NOTE — TELEPHONE ENCOUNTER
Sana's pain over the lateral hip has resolved, but she continues to have pain over the right lateral thigh that is recreated with a R unilateral PA glide to L3. This does improved with lumbar mobility work and manipulations, but there isn't a lasting effect. I am sending her back to Dr. Diane Chen to discuss additional treatment.

## 2020-11-10 ENCOUNTER — HOSPITAL ENCOUNTER (OUTPATIENT)
Dept: PHYSICAL THERAPY | Age: 60
Setting detail: THERAPIES SERIES
Discharge: HOME | End: 2020-11-10
Payer: COMMERCIAL

## 2020-11-12 ENCOUNTER — HOSPITAL ENCOUNTER (OUTPATIENT)
Dept: PHYSICAL THERAPY | Age: 60
Setting detail: THERAPIES SERIES
Discharge: HOME | End: 2020-11-12
Payer: COMMERCIAL

## 2020-11-12 PROCEDURE — 97110 THERAPEUTIC EXERCISES: CPT

## 2020-11-12 PROCEDURE — 97140 MANUAL THERAPY 1/> REGIONS: CPT

## 2020-11-12 NOTE — FLOWSHEET NOTE
[employer]    Physical Therapy Treatment Note/ Progress Report:     Date:  2020    Patient Name:  Gina Collazo   \"TAMMY\" :  1960  MRN: [id number]  Medical/Treatment Diagnosis Information:  · Diagnosis: right hip trochanteric bursitis M70.61  · Treatment Diagnosis: R hip pain  Insurance/Certification information:  PT Insurance Information: [employer], $5000 family deductible, no copay, 70/30 coinsurance, no visit limit as medically necessary  Physician Information:  Referring Practitioner: Randolph Mai MD  Plan of care signed (Y/N):     Date of Patient follow up with Physician:      Progress Report: []  Yes  [x]  No     Functional Scale:  10/13/2020 LEFS = 11% disability    Date Range for reporting period:  Beginning:  10/13/2020  Ending:      Progress report due (10 Rx/or 30 days whichever is less):      Recertification due (POC duration/ or 90 days whichever is less): 2020     Visit # Insurance Allowable Auth Needed   7 Med nec []Yes    [x]No     Pain level:  4/10 currently    SUBJECTIVE: pt notes that she felt good on vacation and on the flight to/from. Had increased pain last few nights, not sure why. Continues to have increased pain with pure standing activities.      OBJECTIVE:    Observation:    Test measurements:      RESTRICTIONS/PRECAUTIONS: L2-3 facet referral    Exercises/Interventions:     Therapeutic Ex Resistance Sets/sec Reps Notes   Hand/heel rocking  1 10    Bridging, band across hips blue 3 10    Prone superman  1 15x Bilat, alternating   SL clams orange 3 10    SL hip abduction 2# 3 10    KTOS stretch  2 30\"    TB sidestepping orange 3 laps    Fwd step up 6\" 2 10 With 6# MB press   Bird dog  1 10    palloff press blue 2 10    SB TB rotation blue 1 20x    Landmine lumbar twists  Landmine RDL  1  2 10  10      1 10    Open books  1 15x                         Manual Intervention 10'       Dry needling Lumbar today   Lumbar gapping 5 min  Manip, bilat   Lumbar Pas 5 min  L2-3 R UPA, gr II   Hip mobs      Attended stim              NMR re-education                                                                          Therapeutic Exercise and NMR EXR  [x] (34663) Provided verbal/tactile cueing for activities related to strengthening, flexibility, endurance, ROM for improvements in LE, proximal hip, and core control with self care, mobility, lifting, ambulation.  [] (23626) Provided verbal/tactile cueing for activities related to improving balance, coordination, kinesthetic sense, posture, motor skill, proprioception  to assist with LE, proximal hip, and core control in self care, mobility, lifting, ambulation and eccentric single leg control.      NMR and Therapeutic Activities:    [x] (93041 or 82878) Provided verbal/tactile cueing for activities related to improving balance, coordination, kinesthetic sense, posture, motor skill, proprioception and motor activation to allow for proper function of core, proximal hip and LE with self care and ADLs  [] (96646) Gait Re-education- Provided training and instruction to the patient for proper LE, core and proximal hip recruitment and positioning and eccentric body weight control with ambulation re-education including up and down stairs     Home Exercise Program:    [x] (45240) Reviewed/Progressed HEP activities related to strengthening, flexibility, endurance, ROM of core, proximal hip and LE for functional self-care, mobility, lifting and ambulation/stair navigation   [] (37643)Reviewed/Progressed HEP activities related to improving balance, coordination, kinesthetic sense, posture, motor skill, proprioception of core, proximal hip and LE for self care, mobility, lifting, and ambulation/stair navigation      Manual Treatments:  PROM / STM / Oscillations-Mobs:  G-I, II, III, IV (PA's, Inf., Post.)  [x] (56183) Provided manual therapy to mobilize LE, proximal hip and/or LS spine soft tissue/joints for the purpose of modulating pain, promoting relaxation,  increasing ROM, reducing/eliminating soft tissue swelling/inflammation/restriction, improving soft tissue extensibility and allowing for proper ROM for normal function with self care, mobility, lifting and ambulation. Spoke with   regarding the use of Dry Needling     Dry needling manual therapy: consisted on the placement of 7 needles in the following muscles:  glute med, ITB. A 60 mm needle was inserted, piston, rotated, and coned to produce intramuscular mobilization. These techniques were used to restore functional range of motion, reduce muscle spasm and induce healing in the corresponding musculature. (85820)  Clean Technique was utilized today while applying Dry needling treatment. The treatment sites where cleaned with 70% solution of  isopropyl alcohol . The PT washed their hands and utilized treatment gloves along with hand  prior to inserting the needles. All needles where removed and discarded in the appropriate sharps container. MD has given verbal and/or written approval for this treatment. Attended low frequency (1-20Hz) electrical stimulation was utilized in conjunction with Dry Needling:  the Estim was manipulated between all above needles for a period of 8 min. at 5 volts. The low frequency electrical stimulation was used to help reduce muscle spasm and help to interrupt /Taswell the pain cycle.  (23367)       Modalities:      Charges:  Timed Code Treatment Minutes: 45   Total Treatment Minutes: 45       [] EVAL (LOW) 91364 (typically 20 minutes face-to-face)  [] EVAL (MOD) 98923 (typically 30 minutes face-to-face)  [] EVAL (HIGH) 06208 (typically 45 minutes face-to-face)  [] RE-EVAL     [x] DS(76155) x 2    [] IONTO (68433)  [] NMR (69910) x     [] VASO (26434)  [x] Manual (38763) x  1   [] Other: dry needling  [] TA (36509)x     [] Mech Traction (15562)  [] ES(attended) (47478)     [] ES (un) (50344):       GOALS:   Patient stated goal: decreased hip pain  [] Progressing: [] Met: [] Not Met: [] Adjusted    Therapist goals for Patient:   Short Term Goals: To be achieved in: 2 weeks  1. Independent in HEP and progression per patient tolerance, in order to prevent re-injury. [] Progressing: [] Met: [] Not Met: [] Adjusted  2. Patient will have a decrease in pain to facilitate improvement in movement, function, and ADLs as indicated by Functional Deficits. [] Progressing: [] Met: [] Not Met: [] Adjusted    Long Term Goals: To be achieved in: 4 weeks  1. Disability index score of 0% or less for the LEFS to assist with reaching prior level of function. [] Progressing: [] Met: [] Not Met: [] Adjusted  2. Patient will demonstrate increased AROM to WNL to allow for proper joint functioning as indicated by patients Functional Deficits. [] Progressing: [] Met: [] Not Met: [] Adjusted  3. Patient will demonstrate an increase in Strength to good proximal hip strength and control, within 5lb HHD in LE to allow for proper functional mobility as indicated by patients Functional Deficits. [] Progressing: [] Met: [] Not Met: [] Adjusted  4. Patient will return to standing functional activities without increased symptoms or restriction. [] Progressing: [] Met: [] Not Met: [] Adjusted  5. Pt will complete full shower without increased hip pain    [] Progressing: [] Met: [] Not Met: [] Adjusted     Progression Towards Functional goals:  [x] Patient is progressing as expected towards functional goals listed. [] Progression is slowed due to complexities listed. [] Progression has been slowed due to co-morbidities. [] Plan just implemented, too soon to assess goals progression  [] Other:     ASSESSMENT: Pain reduced to 1/10 with manual therapy and resolved post therex. Right thigh pain continues to be of lumbar origin.   Pt has a follow up with Dr. Elsa Ibarra next week to discuss treatment options, as her relief with therapy is temporary. Treatment/Activity Tolerance:  [x] Patient tolerated treatment well [] Patient limited by fatique  [] Patient limited by pain  [] Patient limited by other medical complications  [] Other:     Overall Progression Towards Functional goals/ Treatment Progress Update:  [] Patient is progressing as expected towards functional goals listed. [] Progression is slowed due to complexities/Impairments listed. [] Progression has been slowed due to co-morbidities. [x] Plan just implemented, too soon to assess goals progression <30days   [] Goals require adjustment due to lack of progress  [] Patient is not progressing as expected and requires additional follow up with physician  [] Other    Prognosis for POC: [x] Good [] Fair  [] Poor    Patient requires continued skilled intervention: [x] Yes  [] No        PLAN:   [] Continue per plan of care [] Alter current plan (see comments)  [] Plan of care initiated [x] Hold pending MD visit [] Discharge    Electronically signed by: Jimmy West PT    Note: If patient does not return for scheduled/recommended follow up visits, this note will serve as a discharge from care along with the most recent update on progress.

## 2020-11-18 ENCOUNTER — OFFICE VISIT (OUTPATIENT)
Dept: ORTHOPEDIC SURGERY | Age: 60
End: 2020-11-18
Payer: COMMERCIAL

## 2020-11-18 PROCEDURE — 99213 OFFICE O/P EST LOW 20 MIN: CPT | Performed by: ORTHOPAEDIC SURGERY

## 2020-11-18 RX ORDER — METHYLPREDNISOLONE 4 MG/1
TABLET ORAL
Qty: 1 KIT | Refills: 0 | Status: SHIPPED | OUTPATIENT
Start: 2020-11-18 | End: 2020-12-04 | Stop reason: ALTCHOICE

## 2020-12-03 NOTE — PROGRESS NOTES
Results for the following test have been finalized and entered into the patients chart
instability. There are no rashes, ulcerations or lesions. Strength and tone are normal.    Radiology:     X-rays obtained and reviewed in office:  Views 2 views lumbar spine does demonstrate significant degenerative change at the L3-L4 level with complete disc space loss as well as mild retrolisthesis of L3 on L4. She also does demonstrate some degenerative scoliosis        Assessment : Lumbar radiculopathy with lumbar degenerative disc disease    Impression:  Encounter Diagnoses   Name Primary?  Low back pain, unspecified back pain laterality, unspecified chronicity, unspecified whether sciatica present Yes    Lumbar radiculopathy     Lumbar degenerative disc disease        Office Procedures:  Orders Placed This Encounter   Procedures    XR LUMBAR SPINE (2-3 VIEWS)     Standing Status:   Future     Number of Occurrences:   1     Standing Expiration Date:   11/18/2021    MRI LUMBAR SPINE WO CONTRAST     MRI lumbar spine R/O lumbar radiculopathy    Will schedule at [facility] once approved       Treatment Plan: I discussed the diagnosis and treatment options with her today. Recommend at this time MRI of the lumbar spine and I am also going to refer her to Dr. Adrian Adams for consultation for potential epidural steroid injection. I am going to give her a Medrol Dosepak today as well to see if we can help reduce down symptoms. She will continue her home therapy exercises.

## 2020-12-04 ENCOUNTER — TELEPHONE (OUTPATIENT)
Dept: ORTHOPEDIC SURGERY | Age: 60
End: 2020-12-04

## 2020-12-04 ENCOUNTER — OFFICE VISIT (OUTPATIENT)
Dept: ORTHOPEDIC SURGERY | Age: 60
End: 2020-12-04
Payer: COMMERCIAL

## 2020-12-04 VITALS — TEMPERATURE: 98.2 F | WEIGHT: 145 LBS | RESPIRATION RATE: 12 BRPM | BODY MASS INDEX: 20.3 KG/M2 | HEIGHT: 71 IN

## 2020-12-04 PROCEDURE — 99243 OFF/OP CNSLTJ NEW/EST LOW 30: CPT | Performed by: PHYSICAL MEDICINE & REHABILITATION

## 2020-12-04 NOTE — TELEPHONE ENCOUNTER
Patient will be called on 12/11/20. If covid symptom free, she will be scheduled at that time.  Letter open in epic

## 2020-12-04 NOTE — PROGRESS NOTES
New Patient: SPINE    Referring Provider:  Jamie Jackson MD    Chief Complaint   Patient presents with    Lower Back Pain     NP, LSP    Leg Pain     Left leg pain, mostly to knee       HISTORY OF PRESENT ILLNESS:      · The patient is being sent at the request of Jamie Jackson MD in consultation as a new spine patient for low back pain and right leg pain The patient is a 62 y.o. female whom reports she has had intermittent low back pain radiating to her right leg. She reports this radiates into her knee and has tried physical therapy without any relief. She denies having any weakness. She did undergo an MRI of the lumbar spine after seeing Dr. Christiano Pate. She reports at times her pain does increase from a 2 out of 10 to a 5 out of 10. Standing is limited in walking is limited at times. Pain Assessment  Location of Pain: Back(LSP)  Location Modifiers: Right, Posterior(Leg pain)  Severity of Pain: 2  Quality of Pain: Aching  Duration of Pain: Persistent  Frequency of Pain: Constant  Aggravating Factors: Standing  Limiting Behavior: Yes  Relieving Factors: Rest  Result of Injury: No  Work-Related Injury: No  Are there other pain locations you wish to document?: No      Associated signs and symptoms:   Neurogenic bowel or bladder symptoms:  no   Perceived weakness:  no   Difficulty walking:  no    Recent Imaging (within past one year)   Xrays: no   MRI or CT of spine: yes    Current/Past Treatment:   · Physical Therapy:  yes  · Chiropractic:  none  · Injection:  none  · Medications:   NSAIDS:  yes   Muscle relaxer:  none   Steriods:  none   Neuropathic medications:  none   Opioids:  none  · Previous surgery:  no  · Previous surgical consult:  no  ·       Past medical, surgical, social and family history reviewed with the patient. No pertinent relevant history            Past Medical History:   History reviewed. No pertinent past medical history.    Past Surgical History:     Past Surgical History: pinprick  · Coordination of the upper and lower extremities are normal.      LUMBAR/SACRAL EXAMINATION:  · Inspection: Local inspection shows no step-off or bruising. Lumbar alignment is normal. No instability is noted. · Palpation:   No evidence of tenderness at the midline. Lumbar paraspinal tenderness Mild L4/5 and L5/S1 tenderness  Bursal tenderness No tenderness bilaterally  There is no paraspinal spasm. · Range of Motion: limited by 25% in all planes due to pain  · Strength:   Strength testing is 5/5 in all muscle groups tested. · Special Tests:   Straight leg raise + on right and crossed SLR negative. · Skin: There are no rashes, ulcerations or lesions. · Reflexes: Reflexes are symmetrically 2+ at the patellar and ankle tendons. Clonus absent bilaterally at the feet. · Gait & station: normal, patient ambulates without assistance and no ataxia  · Additional Examinations:  · RIGHT LOWER EXTREMITY: Inspection/examination of the right lower extremity does not show any tenderness, deformity or injury. Range of motion is unremarkable. There is no gross instability. There are no rashes, ulcerations or lesions. Strength and tone are normal. No atrophy or abnormal movements are noted. · LEFT LOWER EXTREMITY:  Inspection/examination of the left lower extremity does not show any tenderness, deformity or injury. Range of motion is unremarkable. There is no gross instability. There are no rashes, ulcerations or lesions. Strength and tone are normal. No atrophy or abnormal movements are noted. Diagnostic Testing:      No results found. Xrays:   None  MRI or CT:  MRI Lumbar spine - 11/30/20  1. Left paracentral protrusion type L4-5 disc herniation indenting the anterior thecal sac. 2. Mild bilateral L3-4 and mild bilateral L5-S1 foraminal stenosis. 3. Degenerative disc disease and spondylosis involving the lumbar spine producing moderate L3-4     central spinal stenosis.     4. 3mm retrolisthesis of L3 on L4 and minimal retrolisthesis of L4 on L5        EMG:  None  No results found for this or any previous visit. Impression (Medical Decision Making):       1. Lumbar radiculopathy    2. Lumbar degenerative disc disease    3. HNP (herniated nucleus pulposus), lumbar    4. Spondylolisthesis of lumbar region        Plan (Medical Decision Making):    I discussed the diagnosis and the treatment options with Samara Lara today. In Summary:  The various treatment options were outlined and discussed with Samara Lara including:  Conservative care options: physical therapy, ice, medications, bracing, and activity modification. The indications for therapeutic injections. The indications for additional imaging/laboratory studies. The indications for (possible future) interventions. After considering the various options discussed, Samara Jane elected to pursue a course of treatment that includes the followin. Medications: Continue anti-inflammatories with appropriate GI Precautions including to stop if develop dark tarry stools or GI upset and to take with food. 2. PT:  Encouraged to continue with Home exercise program.    3. Further studies: COVID-19 PCR testing      4. Interventional:  We discussed pursuing a Right L3 and L4 TF epidural steroid injection to address the pain. Radiologic imaging and symptoms confirm the pain etiology. Risks, benefits and alternatives of interventional options were discussed. These include and are not limited to bleeding, infection, spinal headache, nerve injury, increased pain and lack of pain relief. The patient verbalized understanding and would like to proceed. The patient will be scheduled accordingly.      First Epidural steroid injection for therapeutic purposes    As such, I have confirmed the patient has met the general requirements including failure of conservative management including prescription strength analgesics, adjunctive medications were utilized , therapeutic exercise program, and no underlying addiction or behavioral disorders were identified to the ability of the provider. Symptoms are impacting their ADLs or iADLs such as walking and significant pain was noted in the HPI. Imaging studies as noted in the diagnostic imaging section of the consultation were reviewed and correlated with clinical findings. Fluoroscopy is utilized for interventional procedures. The patient presents with radicular pain or claudication type symptoms associated with functional impairment. Review of diagnostic imaging in the diagnostic studies section of the consultation shows nerve root compression and correlates with clinical findings. The patient has failed at least 4 weeks of appropriate conservative management. 5. Healthy Lifestyle Measures:  Patient education material reviewing the following was distributed to Shannen Ramos  Anatomic drawings  Healthy lifestyle education  Osteoporosis prevention,   Back and neck pain educational information   Advanced imaging preparedness    Posture education   Proper lifting and carrying techniques,   Weight management  Quitting smoking and   Minor ways to treat back pain  For further information regarding the spine conditions and to review interventional treatments the patient was directed to [facility].    6.  Follow up:  2-3 weeks    Shannen Ramos was instructed to call the office if her symptoms worsen or if new symptoms appear prior to the next scheduled visit. She is specifically instructed to contact the office between now & her scheduled appointment if she has concerns related to her condition or if she needs assistance in scheduling the above tests. She is welcome to call for an appointment sooner if she has any additional concerns or questions. Guerline Ga.  Randy Soares MD, KRISTA, [facility]  Board Certified in [address] [place] [address] Dr Certified and Fellowship Trained in [facility] Dennis London  Partner of [facility] [facility])     This dictation was performed with a verbal recognition program [facility]) and it was checked for errors. It is possible that there are still dictated errors within this office note. If so, please bring any errors to my attention for an addendum. All efforts were made to ensure that this office note is accurate.

## 2020-12-04 NOTE — TELEPHONE ENCOUNTER
Patient needs scheduled for an injection. Pre-Procedure sheet was given to the patient. x1 (RIGHT L3 & RIGHT L4 TF)    Patient exposed to Landy on 11/28/20. Per [facility], patient to be called in a week to schedule. As long as she is not experiencing COVID symptoms at that time, she can proceed with scheduling of BERTRAND and COVID screening. Patient was given packet in office today.

## 2020-12-04 NOTE — LETTER
Please schedule the following with:     Date:   20 @ 7:50   Account: [id number]  Patient: Coco Shi    : 1960  Address:  [address] [place] [address]    Phone (H):  [phone] (home)      ----------------------------------------------------------------------------------------------  Diagnosis:     ICD-10-CM    1. Lumbar radiculopathy  M54.16    2. Lumbar degenerative disc disease  M51.36    3. HNP (herniated nucleus pulposus), lumbar  M51.26    4. Spondylolisthesis of lumbar region  M43.16          Levels:Right L3 and L4 Transforaminal BERTRAND  CPT Codes [id number], [id number]    ----------------------------------------------------------------------------------------------  Injection # 1   ASC    Attending Physician       Ny Zaman.  Lit Gandara MD.  ----------------------------------------------------------------------------------------------  Injection Scheduled For:    At:    [address]    Pre-Cert#  2nd Insurance     Pre-Cert#    Comments or Special instructions:  COVID TEST: yes    · Infection control  · Tested positive for MRSA in past 12 months:  no  · Tested positive for MSSA \"staph infection\" in past 12 months: no  · Tested positive for VRE (Vancomycin Resistant Enterococci) in past 12 months:   no  · Currently on any antibiotics for an infection: no  · Anticoagulants:  · On a blood thinner:  no   · Any history of bleeding disorder: no   · Advanced Liver disease: no   · Advanced Renal disease: no   · Glaucoma: no   · Diabetes: no     Sedation:  Yes  -----------------------------------------------------------------------------------------------  Allergies   Allergen Reactions    Amoxicillin Rash

## 2020-12-10 ENCOUNTER — TELEPHONE (OUTPATIENT)
Dept: ORTHOPEDIC SURGERY | Age: 60
End: 2020-12-10

## 2020-12-10 NOTE — TELEPHONE ENCOUNTER
Auth: NPR  Date: 12/18/220  Reference # None  Spoke with: Darlin  Type of SX: Outpatient BERTRAND  Location: [address] [place] [address]  CPT [id number], [id number], [id number], [id number]   SX area: Lumbar spine  Insurance: [employer]

## 2020-12-14 ENCOUNTER — OFFICE VISIT (OUTPATIENT)
Dept: PRIMARY CARE CLINIC | Age: 60
End: 2020-12-14
Payer: COMMERCIAL

## 2020-12-14 PROCEDURE — 99211 OFF/OP EST MAY X REQ PHY/QHP: CPT | Performed by: NURSE PRACTITIONER

## 2020-12-14 NOTE — PATIENT INSTRUCTIONS

## 2020-12-14 NOTE — PROGRESS NOTES
Terri Escobar received a viral test for COVID-19. They were educated on isolation and quarantine as appropriate. For any symptoms, they were directed to seek care from their PCP, given contact information to establish with a doctor, directed to an urgent care or the emergency room.

## 2020-12-16 ENCOUNTER — TELEPHONE (OUTPATIENT)
Dept: ORTHOPEDIC SURGERY | Age: 60
End: 2020-12-16

## 2020-12-16 LAB — SARS-COV-2, NAA: DETECTED

## 2020-12-16 NOTE — TELEPHONE ENCOUNTER
PATIENT TESTED POSITIVE FOR COVID AND NEEDS TO CANCEL HER PROCEDURE. PLEASE CALL TO CONFIRM [phone].

## 2020-12-16 NOTE — TELEPHONE ENCOUNTER
S/w patient. Tested positive for covid. All cancelled. She will call back to r/s once she has 1 negative covid test results.   Patient was scheduled for 12/18/20

## 2021-01-06 ENCOUNTER — OFFICE VISIT (OUTPATIENT)
Dept: PRIMARY CARE CLINIC | Age: 61
End: 2021-01-06
Payer: COMMERCIAL

## 2021-01-06 DIAGNOSIS — Z20.828 EXPOSURE TO SARS-ASSOCIATED CORONAVIRUS: Primary | ICD-10-CM

## 2021-01-06 PROCEDURE — 99211 OFF/OP EST MAY X REQ PHY/QHP: CPT | Performed by: NURSE PRACTITIONER

## 2021-01-06 NOTE — PROGRESS NOTES
Kera Donovan received a viral test for COVID-19. They were educated on isolation and quarantine as appropriate. For any symptoms, they were directed to seek care from their PCP, given contact information to establish with a doctor, directed to an urgent care or the emergency room.

## 2021-01-07 LAB — SARS-COV-2, NAA: NOT DETECTED

## 2021-01-22 ENCOUNTER — TELEPHONE (OUTPATIENT)
Dept: ORTHOPEDIC SURGERY | Age: 61
End: 2021-01-22

## 2021-01-22 NOTE — TELEPHONE ENCOUNTER
Due to the transition, patient will need to reach out to the [address] [place] [address] office for scheduling or   Schedule an office visit with DARRIAN